# Patient Record
Sex: FEMALE | Race: WHITE | Employment: FULL TIME | URBAN - METROPOLITAN AREA
[De-identification: names, ages, dates, MRNs, and addresses within clinical notes are randomized per-mention and may not be internally consistent; named-entity substitution may affect disease eponyms.]

---

## 2022-05-21 ENCOUNTER — APPOINTMENT (OUTPATIENT)
Dept: GENERAL RADIOLOGY | Age: 22
DRG: 923 | End: 2022-05-21
Attending: EMERGENCY MEDICINE
Payer: COMMERCIAL

## 2022-05-21 ENCOUNTER — HOSPITAL ENCOUNTER (INPATIENT)
Age: 22
LOS: 1 days | Discharge: HOME | DRG: 923 | End: 2022-05-22
Attending: EMERGENCY MEDICINE | Admitting: HOSPITALIST
Payer: COMMERCIAL

## 2022-05-21 ENCOUNTER — APPOINTMENT (OUTPATIENT)
Dept: CT IMAGING | Age: 22
DRG: 923 | End: 2022-05-21
Attending: EMERGENCY MEDICINE
Payer: COMMERCIAL

## 2022-05-21 DIAGNOSIS — T67.01XA HEAT STROKE, INITIAL ENCOUNTER: ICD-10-CM

## 2022-05-21 DIAGNOSIS — R56.9 SEIZURE (HCC): Primary | ICD-10-CM

## 2022-05-21 DIAGNOSIS — N17.9 AKI (ACUTE KIDNEY INJURY) (HCC): ICD-10-CM

## 2022-05-21 DIAGNOSIS — E87.20 LACTIC ACIDOSIS: ICD-10-CM

## 2022-05-21 LAB
ALBUMIN SERPL-MCNC: 4.6 G/DL (ref 3.5–5)
ALBUMIN/GLOB SERPL: 1.2 {RATIO} (ref 1.1–2.2)
ALP SERPL-CCNC: 144 U/L (ref 45–117)
ALT SERPL-CCNC: 39 U/L (ref 12–78)
AMPHET UR QL SCN: NEGATIVE
ANION GAP SERPL CALC-SCNC: 23 MMOL/L (ref 5–15)
APPEARANCE UR: ABNORMAL
AST SERPL-CCNC: 23 U/L (ref 15–37)
BACTERIA URNS QL MICRO: ABNORMAL /HPF
BARBITURATES UR QL SCN: NEGATIVE
BENZODIAZ UR QL: NEGATIVE
BILIRUB SERPL-MCNC: 0.2 MG/DL (ref 0.2–1)
BILIRUB UR QL: NEGATIVE
BUN SERPL-MCNC: 15 MG/DL (ref 6–20)
BUN/CREAT SERPL: 9 (ref 12–20)
CALCIUM SERPL-MCNC: 9.1 MG/DL (ref 8.5–10.1)
CANNABINOIDS UR QL SCN: POSITIVE
CHLORIDE SERPL-SCNC: 102 MMOL/L (ref 97–108)
CK SERPL-CCNC: 226 U/L (ref 26–192)
CO2 SERPL-SCNC: 16 MMOL/L (ref 21–32)
COCAINE UR QL SCN: NEGATIVE
COLOR UR: ABNORMAL
CREAT SERPL-MCNC: 1.6 MG/DL (ref 0.55–1.02)
DRUG SCRN COMMENT,DRGCM: ABNORMAL
EPITH CASTS URNS QL MICRO: ABNORMAL /LPF
GLOBULIN SER CALC-MCNC: 3.8 G/DL (ref 2–4)
GLUCOSE SERPL-MCNC: 73 MG/DL (ref 65–100)
GLUCOSE UR STRIP.AUTO-MCNC: NEGATIVE MG/DL
HCG UR QL: NEGATIVE
HGB UR QL STRIP: ABNORMAL
KETONES UR QL STRIP.AUTO: NEGATIVE MG/DL
LACTATE SERPL-SCNC: 5 MMOL/L (ref 0.4–2)
LEUKOCYTE ESTERASE UR QL STRIP.AUTO: NEGATIVE
METHADONE UR QL: NEGATIVE
MUCOUS THREADS URNS QL MICRO: ABNORMAL /LPF
NITRITE UR QL STRIP.AUTO: NEGATIVE
OPIATES UR QL: NEGATIVE
PCP UR QL: NEGATIVE
PH UR STRIP: 5.5 [PH] (ref 5–8)
POTASSIUM SERPL-SCNC: 3.3 MMOL/L (ref 3.5–5.1)
PROT SERPL-MCNC: 8.4 G/DL (ref 6.4–8.2)
PROT UR STRIP-MCNC: 30 MG/DL
RBC #/AREA URNS HPF: ABNORMAL /HPF (ref 0–5)
SODIUM SERPL-SCNC: 141 MMOL/L (ref 136–145)
SP GR UR REFRACTOMETRY: 1.02 (ref 1–1.03)
UR CULT HOLD, URHOLD: NORMAL
UROBILINOGEN UR QL STRIP.AUTO: 0.2 EU/DL (ref 0.2–1)
WBC URNS QL MICRO: ABNORMAL /HPF (ref 0–4)

## 2022-05-21 PROCEDURE — 65270000029 HC RM PRIVATE

## 2022-05-21 PROCEDURE — 83605 ASSAY OF LACTIC ACID: CPT

## 2022-05-21 PROCEDURE — 80053 COMPREHEN METABOLIC PANEL: CPT

## 2022-05-21 PROCEDURE — 82550 ASSAY OF CK (CPK): CPT

## 2022-05-21 PROCEDURE — 85025 COMPLETE CBC W/AUTO DIFF WBC: CPT

## 2022-05-21 PROCEDURE — 74011250636 HC RX REV CODE- 250/636: Performed by: HOSPITALIST

## 2022-05-21 PROCEDURE — 74011250637 HC RX REV CODE- 250/637: Performed by: EMERGENCY MEDICINE

## 2022-05-21 PROCEDURE — 36415 COLL VENOUS BLD VENIPUNCTURE: CPT

## 2022-05-21 PROCEDURE — 74011250636 HC RX REV CODE- 250/636: Performed by: EMERGENCY MEDICINE

## 2022-05-21 PROCEDURE — 80307 DRUG TEST PRSMV CHEM ANLYZR: CPT

## 2022-05-21 PROCEDURE — 81025 URINE PREGNANCY TEST: CPT

## 2022-05-21 PROCEDURE — 99285 EMERGENCY DEPT VISIT HI MDM: CPT

## 2022-05-21 PROCEDURE — 96374 THER/PROPH/DIAG INJ IV PUSH: CPT

## 2022-05-21 PROCEDURE — 71045 X-RAY EXAM CHEST 1 VIEW: CPT

## 2022-05-21 PROCEDURE — 81001 URINALYSIS AUTO W/SCOPE: CPT

## 2022-05-21 PROCEDURE — 70450 CT HEAD/BRAIN W/O DYE: CPT

## 2022-05-21 RX ORDER — SODIUM CHLORIDE 0.9 % (FLUSH) 0.9 %
5-40 SYRINGE (ML) INJECTION EVERY 8 HOURS
Status: DISCONTINUED | OUTPATIENT
Start: 2022-05-21 | End: 2022-05-22 | Stop reason: HOSPADM

## 2022-05-21 RX ORDER — ACETAMINOPHEN 500 MG
TABLET ORAL
Status: DISPENSED
Start: 2022-05-21 | End: 2022-05-22

## 2022-05-21 RX ORDER — VENLAFAXINE HYDROCHLORIDE 75 MG/1
75 CAPSULE, EXTENDED RELEASE ORAL DAILY
COMMUNITY

## 2022-05-21 RX ORDER — ONDANSETRON 2 MG/ML
4 INJECTION INTRAMUSCULAR; INTRAVENOUS
Status: COMPLETED | OUTPATIENT
Start: 2022-05-21 | End: 2022-05-21

## 2022-05-21 RX ORDER — SODIUM CHLORIDE 0.9 % (FLUSH) 0.9 %
5-40 SYRINGE (ML) INJECTION AS NEEDED
Status: DISCONTINUED | OUTPATIENT
Start: 2022-05-21 | End: 2022-05-22 | Stop reason: HOSPADM

## 2022-05-21 RX ORDER — ACETAMINOPHEN 500 MG
1000 TABLET ORAL ONCE
Status: COMPLETED | OUTPATIENT
Start: 2022-05-21 | End: 2022-05-21

## 2022-05-21 RX ORDER — SODIUM CHLORIDE 9 MG/ML
125 INJECTION, SOLUTION INTRAVENOUS CONTINUOUS
Status: DISCONTINUED | OUTPATIENT
Start: 2022-05-21 | End: 2022-05-22

## 2022-05-21 RX ADMIN — SODIUM CHLORIDE 125 ML/HR: 9 INJECTION, SOLUTION INTRAVENOUS at 20:42

## 2022-05-21 RX ADMIN — SODIUM CHLORIDE 1000 ML: 9 INJECTION, SOLUTION INTRAVENOUS at 18:32

## 2022-05-21 RX ADMIN — ONDANSETRON HYDROCHLORIDE 4 MG: 2 SOLUTION INTRAMUSCULAR; INTRAVENOUS at 16:24

## 2022-05-21 RX ADMIN — ACETAMINOPHEN 1000 MG: 500 TABLET ORAL at 18:32

## 2022-05-21 RX ADMIN — SODIUM CHLORIDE 1000 ML: 9 INJECTION, SOLUTION INTRAVENOUS at 16:24

## 2022-05-21 NOTE — ED TRIAGE NOTES
Pt had a seizure 25 min pta. Pt has a hx of seizures until she was [age] years old, but has not has a seizure since. Not being treated for seizures at this time.

## 2022-05-21 NOTE — ED NOTES
Patient assisted to the bathroom to provide a urine sample by RN and friend. Patient's friend is staying with patient in the bathroom. RN close-by to monitor patient while in the bathroom.

## 2022-05-21 NOTE — ED PROVIDER NOTES
The history is provided by the patient, the EMS personnel and a friend. No  was used. Seizure   This is a new problem. The current episode started less than 1 hour ago. The problem has been resolved. There was 1 seizure. Associated symptoms include cough and vomiting. Pertinent negatives include no confusion, no headaches, no visual disturbance, no sore throat, no chest pain, no nausea and no diarrhea. Characteristics include bit tongue. The episode was witnessed. There was no sensation of an aura present. There was return to baseline postseizure. The seizures did not continue in the ED. She reports vomiting, cough. She reports no chest pain, no confusion, no visual disturbance, no diarrhea, no headaches, no sore throat. History reviewed. No pertinent past medical history. No past surgical history on file. History reviewed. No pertinent family history. Social History     Socioeconomic History    Marital status: Not on file     Spouse name: Not on file    Number of children: Not on file    Years of education: Not on file    Highest education level: Not on file   Occupational History    Not on file   Tobacco Use    Smoking status: Not on file    Smokeless tobacco: Not on file   Substance and Sexual Activity    Alcohol use: Not on file    Drug use: Not on file    Sexual activity: Not on file   Other Topics Concern    Not on file   Social History Narrative    Not on file     Social Determinants of Health     Financial Resource Strain:     Difficulty of Paying Living Expenses: Not on file   Food Insecurity:     Worried About Running Out of Food in the Last Year: Not on file    Alen of Food in the Last Year: Not on file   Transportation Needs:     Lack of Transportation (Medical): Not on file    Lack of Transportation (Non-Medical):  Not on file   Physical Activity:     Days of Exercise per Week: Not on file    Minutes of Exercise per Session: Not on file Stress:     Feeling of Stress : Not on file   Social Connections:     Frequency of Communication with Friends and Family: Not on file    Frequency of Social Gatherings with Friends and Family: Not on file    Attends Anabaptist Services: Not on file    Active Member of Clubs or Organizations: Not on file    Attends Club or Organization Meetings: Not on file    Marital Status: Not on file   Intimate Partner Violence:     Fear of Current or Ex-Partner: Not on file    Emotionally Abused: Not on file    Physically Abused: Not on file    Sexually Abused: Not on file   Housing Stability:     Unable to Pay for Housing in the Last Year: Not on file    Number of [place] in the Last Year: Not on file    Unstable Housing in the Last Year: Not on file         ALLERGIES: Patient has no known allergies. Review of Systems   Constitutional: Negative for activity change, chills and fever. HENT: Negative for nosebleeds, sore throat, trouble swallowing and voice change. Eyes: Negative for visual disturbance. Respiratory: Positive for cough. Negative for shortness of breath. Cardiovascular: Negative for chest pain and palpitations. Gastrointestinal: Positive for vomiting. Negative for abdominal pain, constipation, diarrhea and nausea. Genitourinary: Negative for difficulty urinating, dysuria, hematuria and urgency. Musculoskeletal: Negative for back pain, neck pain and neck stiffness. Skin: Negative for color change. Allergic/Immunologic: Negative for immunocompromised state. Neurological: Negative for dizziness, seizures, syncope, weakness, light-headedness, numbness and headaches. Psychiatric/Behavioral: Negative for behavioral problems, confusion, hallucinations, self-injury and suicidal ideas.        Vitals:    05/21/22 1616 05/21/22 1620   BP:  124/79   Pulse: (!) 157 (!) 156   Resp: 18 18   Temp:  98.8 °F (37.1 °C)   SpO2: 99% 98%   Weight: 66.6 kg (146 lb 13.2 oz)             Physical Exam  Vitals and nursing note reviewed. Constitutional:       General: She is not in acute distress. Appearance: She is well-developed. She is not diaphoretic. HENT:      Head: Normocephalic and atraumatic. Mouth/Throat:     Eyes:      Pupils: Pupils are equal, round, and reactive to light. Cardiovascular:      Rate and Rhythm: Regular rhythm. Tachycardia present. Heart sounds: Normal heart sounds. No murmur heard. No friction rub. No gallop. Pulmonary:      Effort: Pulmonary effort is normal. No respiratory distress. Breath sounds: Normal breath sounds. No wheezing. Abdominal:      General: Bowel sounds are normal. There is no distension. Palpations: Abdomen is soft. Tenderness: There is no abdominal tenderness. There is no guarding or rebound. Musculoskeletal:         General: Normal range of motion. Cervical back: Normal range of motion and neck supple. Skin:     General: Skin is warm. Findings: No rash. Neurological:      Mental Status: She is alert and oriented to person, place, and time. Psychiatric:         Behavior: Behavior normal.         Thought Content: Thought content normal.         Judgment: Judgment normal.          MDM  Number of Diagnoses or Management Options  JOHANNY (acute kidney injury) ([facility] [address]  Heat stroke, initial encounter  Lactic acidosis  Seizure ([facility] [address]  Diagnosis management comments: Total critical care time spent exclusive of procedures:  36min       This is a 41-year-old female with past medical history, review of systems, physical exam as above, presenting via EMS for seizure. Per EMS, patient with witnessed seizure by bystanders, unclear duration, a friend who was shopping with her was not beside her when this episode occurred. The friend who was shopping states when she arrived the patient was unconscious, and covered with ice water. Upon arrival patient awake and alert, tachycardic, coughing, and vomiting.   She states history of febrile seizure is a 1year-old, denies medical problems. She states she smokes marijuana daily a, occasional alcohol use. She denies illness prior to today's event, however states she has had poor p.o. intake, and extremely hot day. Physical exam remarkable for well-appearing young female, in no acute distress noted to be normotensive, afebrile, tachycardic, satting well on room air, despite coughing and vomiting. EMS states they were concerned for aspiration based on coughing on scene. She is noted to have hemostatic laceration to the left tongue. Suspect seizure, unclear whether this may be related to heat. Plan to obtain head CT, CMP, CBC, UA, UDS, urine pregnancy test, CK, lactic acid. Will provide IV fluids, antiemetics, obtain chest x-ray. We will reassess, and make a disposition. Procedures    Perfect Serve Consult for Admission  6:06 PM    ED Room Number: SER03/03  Patient Name and age:  Raghavendra Javier 19 y.o.  female  Working Diagnosis:   1. Seizure ([facility] [address]    2. Heat stroke, initial encounter    3. JOHANNY (acute kidney injury) ([facility] [address]    4.  Lactic acidosis        COVID-19 Suspicion:  no  Sepsis present:  no  Reassessment needed: yes  Code Status:  Full Code  Readmission: no  Isolation Requirements:  no  Recommended Level of Care:  telemetry  Department:[facility] - [phone]  Other:  D/w  [facility]

## 2022-05-22 ENCOUNTER — APPOINTMENT (OUTPATIENT)
Dept: MRI IMAGING | Age: 22
DRG: 923 | End: 2022-05-22
Attending: INTERNAL MEDICINE
Payer: COMMERCIAL

## 2022-05-22 ENCOUNTER — APPOINTMENT (OUTPATIENT)
Dept: CT IMAGING | Age: 22
DRG: 923 | End: 2022-05-22
Attending: INTERNAL MEDICINE
Payer: COMMERCIAL

## 2022-05-22 VITALS
BODY MASS INDEX: 26.02 KG/M2 | HEIGHT: 63 IN | SYSTOLIC BLOOD PRESSURE: 114 MMHG | DIASTOLIC BLOOD PRESSURE: 64 MMHG | RESPIRATION RATE: 14 BRPM | WEIGHT: 146.83 LBS | HEART RATE: 84 BPM | TEMPERATURE: 98 F | OXYGEN SATURATION: 100 %

## 2022-05-22 LAB
ALBUMIN SERPL-MCNC: 3.2 G/DL (ref 3.5–5)
ALBUMIN/GLOB SERPL: 1 {RATIO} (ref 1.1–2.2)
ALP SERPL-CCNC: 101 U/L (ref 45–117)
ALT SERPL-CCNC: 34 U/L (ref 12–78)
ANION GAP SERPL CALC-SCNC: 5 MMOL/L (ref 5–15)
AST SERPL-CCNC: 48 U/L (ref 15–37)
BASOPHILS # BLD: 0 K/UL (ref 0–0.1)
BASOPHILS # BLD: 0.1 K/UL (ref 0–0.1)
BASOPHILS NFR BLD: 0 % (ref 0–1)
BASOPHILS NFR BLD: 1 % (ref 0–1)
BILIRUB SERPL-MCNC: 0.5 MG/DL (ref 0.2–1)
BUN SERPL-MCNC: 8 MG/DL (ref 6–20)
BUN/CREAT SERPL: 9 (ref 12–20)
CALCIUM SERPL-MCNC: 8.2 MG/DL (ref 8.5–10.1)
CHLORIDE SERPL-SCNC: 111 MMOL/L (ref 97–108)
CO2 SERPL-SCNC: 25 MMOL/L (ref 21–32)
CREAT SERPL-MCNC: 0.94 MG/DL (ref 0.55–1.02)
CRP SERPL-MCNC: 0.77 MG/DL (ref 0–0.6)
DIFFERENTIAL METHOD BLD: ABNORMAL
DIFFERENTIAL METHOD BLD: NORMAL
EOSINOPHIL # BLD: 0.3 K/UL (ref 0–0.4)
EOSINOPHIL # BLD: 0.4 K/UL (ref 0–0.4)
EOSINOPHIL NFR BLD: 2 % (ref 0–7)
EOSINOPHIL NFR BLD: 3 % (ref 0–7)
ERYTHROCYTE [DISTWIDTH] IN BLOOD BY AUTOMATED COUNT: 13.2 % (ref 11.5–14.5)
ERYTHROCYTE [DISTWIDTH] IN BLOOD BY AUTOMATED COUNT: 13.4 % (ref 11.5–14.5)
GLOBULIN SER CALC-MCNC: 3.1 G/DL (ref 2–4)
GLUCOSE SERPL-MCNC: 78 MG/DL (ref 65–100)
HCT VFR BLD AUTO: 38.5 % (ref 35–47)
HCT VFR BLD AUTO: 45.7 % (ref 35–47)
HGB BLD-MCNC: 12.3 G/DL (ref 11.5–16)
HGB BLD-MCNC: 14.5 G/DL (ref 11.5–16)
IMM GRANULOCYTES # BLD AUTO: 0 K/UL
IMM GRANULOCYTES # BLD AUTO: 0 K/UL (ref 0–0.04)
IMM GRANULOCYTES NFR BLD AUTO: 0 %
IMM GRANULOCYTES NFR BLD AUTO: 0 % (ref 0–0.5)
LACTATE SERPL-SCNC: 0.8 MMOL/L (ref 0.4–2)
LIPASE SERPL-CCNC: 82 U/L (ref 73–393)
LYMPHOCYTES # BLD: 3.3 K/UL (ref 0.8–3.5)
LYMPHOCYTES # BLD: 8.1 K/UL (ref 0.8–3.5)
LYMPHOCYTES NFR BLD: 30 % (ref 12–49)
LYMPHOCYTES NFR BLD: 40 % (ref 12–49)
MAGNESIUM SERPL-MCNC: 2.4 MG/DL (ref 1.6–2.4)
MCH RBC QN AUTO: 29.1 PG (ref 26–34)
MCH RBC QN AUTO: 29.5 PG (ref 26–34)
MCHC RBC AUTO-ENTMCNC: 31.7 G/DL (ref 30–36.5)
MCHC RBC AUTO-ENTMCNC: 31.9 G/DL (ref 30–36.5)
MCV RBC AUTO: 91.6 FL (ref 80–99)
MCV RBC AUTO: 92.3 FL (ref 80–99)
MONOCYTES # BLD: 0.8 K/UL (ref 0–1)
MONOCYTES # BLD: 1.6 K/UL (ref 0–1)
MONOCYTES NFR BLD: 7 % (ref 5–13)
MONOCYTES NFR BLD: 8 % (ref 5–13)
NEUTS SEG # BLD: 10.2 K/UL (ref 1.8–8)
NEUTS SEG # BLD: 6.4 K/UL (ref 1.8–8)
NEUTS SEG NFR BLD: 50 % (ref 32–75)
NEUTS SEG NFR BLD: 59 % (ref 32–75)
NRBC # BLD: 0 K/UL (ref 0–0.01)
NRBC # BLD: 0 K/UL (ref 0–0.01)
NRBC BLD-RTO: 0 PER 100 WBC
NRBC BLD-RTO: 0 PER 100 WBC
PATH REV BLD -IMP: ABNORMAL
PHOSPHATE SERPL-MCNC: 2.8 MG/DL (ref 2.6–4.7)
PLATELET # BLD AUTO: 292 K/UL (ref 150–400)
PLATELET # BLD AUTO: 476 K/UL (ref 150–400)
PMV BLD AUTO: 9.8 FL (ref 8.9–12.9)
PMV BLD AUTO: 9.8 FL (ref 8.9–12.9)
POTASSIUM SERPL-SCNC: 3.6 MMOL/L (ref 3.5–5.1)
PROT SERPL-MCNC: 6.3 G/DL (ref 6.4–8.2)
RBC # BLD AUTO: 4.17 M/UL (ref 3.8–5.2)
RBC # BLD AUTO: 4.99 M/UL (ref 3.8–5.2)
RBC MORPH BLD: ABNORMAL
SODIUM SERPL-SCNC: 141 MMOL/L (ref 136–145)
TSH SERPL DL<=0.05 MIU/L-ACNC: 1.07 UIU/ML (ref 0.36–3.74)
WBC # BLD AUTO: 10.8 K/UL (ref 3.6–11)
WBC # BLD AUTO: 20.3 K/UL (ref 3.6–11)
WBC MORPH BLD: ABNORMAL

## 2022-05-22 PROCEDURE — 83605 ASSAY OF LACTIC ACID: CPT

## 2022-05-22 PROCEDURE — 70551 MRI BRAIN STEM W/O DYE: CPT

## 2022-05-22 PROCEDURE — 99221 1ST HOSP IP/OBS SF/LOW 40: CPT | Performed by: PSYCHIATRY & NEUROLOGY

## 2022-05-22 PROCEDURE — 74011250637 HC RX REV CODE- 250/637: Performed by: INTERNAL MEDICINE

## 2022-05-22 PROCEDURE — 83735 ASSAY OF MAGNESIUM: CPT

## 2022-05-22 PROCEDURE — 74011000250 HC RX REV CODE- 250: Performed by: HOSPITALIST

## 2022-05-22 PROCEDURE — 80053 COMPREHEN METABOLIC PANEL: CPT

## 2022-05-22 PROCEDURE — 36415 COLL VENOUS BLD VENIPUNCTURE: CPT

## 2022-05-22 PROCEDURE — 74011250636 HC RX REV CODE- 250/636: Performed by: INTERNAL MEDICINE

## 2022-05-22 PROCEDURE — 74011000250 HC RX REV CODE- 250: Performed by: INTERNAL MEDICINE

## 2022-05-22 PROCEDURE — 86140 C-REACTIVE PROTEIN: CPT

## 2022-05-22 PROCEDURE — 83690 ASSAY OF LIPASE: CPT

## 2022-05-22 PROCEDURE — 84443 ASSAY THYROID STIM HORMONE: CPT

## 2022-05-22 PROCEDURE — 85025 COMPLETE CBC W/AUTO DIFF WBC: CPT

## 2022-05-22 PROCEDURE — 84100 ASSAY OF PHOSPHORUS: CPT

## 2022-05-22 RX ORDER — SODIUM CHLORIDE 0.9 % (FLUSH) 0.9 %
5-40 SYRINGE (ML) INJECTION AS NEEDED
Status: DISCONTINUED | OUTPATIENT
Start: 2022-05-22 | End: 2022-05-22 | Stop reason: HOSPADM

## 2022-05-22 RX ORDER — ENOXAPARIN SODIUM 100 MG/ML
40 INJECTION SUBCUTANEOUS DAILY
Status: DISCONTINUED | OUTPATIENT
Start: 2022-05-22 | End: 2022-05-22 | Stop reason: HOSPADM

## 2022-05-22 RX ORDER — POTASSIUM CHLORIDE 750 MG/1
40 TABLET, FILM COATED, EXTENDED RELEASE ORAL
Status: COMPLETED | OUTPATIENT
Start: 2022-05-22 | End: 2022-05-22

## 2022-05-22 RX ORDER — VANCOMYCIN/0.9 % SOD CHLORIDE 1.5G/250ML
1500 PLASTIC BAG, INJECTION (ML) INTRAVENOUS ONCE
Status: DISCONTINUED | OUTPATIENT
Start: 2022-05-22 | End: 2022-05-22

## 2022-05-22 RX ORDER — ONDANSETRON 4 MG/1
4 TABLET, ORALLY DISINTEGRATING ORAL
Status: DISCONTINUED | OUTPATIENT
Start: 2022-05-22 | End: 2022-05-22 | Stop reason: HOSPADM

## 2022-05-22 RX ORDER — ACETAMINOPHEN 650 MG/1
650 SUPPOSITORY RECTAL
Status: DISCONTINUED | OUTPATIENT
Start: 2022-05-22 | End: 2022-05-22 | Stop reason: HOSPADM

## 2022-05-22 RX ORDER — ACETAMINOPHEN 325 MG/1
650 TABLET ORAL
Status: DISCONTINUED | OUTPATIENT
Start: 2022-05-22 | End: 2022-05-22 | Stop reason: HOSPADM

## 2022-05-22 RX ORDER — VENLAFAXINE HYDROCHLORIDE 37.5 MG/1
75 CAPSULE, EXTENDED RELEASE ORAL DAILY
Status: DISCONTINUED | OUTPATIENT
Start: 2022-05-22 | End: 2022-05-22 | Stop reason: HOSPADM

## 2022-05-22 RX ORDER — SODIUM CHLORIDE, SODIUM LACTATE, POTASSIUM CHLORIDE, CALCIUM CHLORIDE 600; 310; 30; 20 MG/100ML; MG/100ML; MG/100ML; MG/100ML
150 INJECTION, SOLUTION INTRAVENOUS CONTINUOUS
Status: DISCONTINUED | OUTPATIENT
Start: 2022-05-22 | End: 2022-05-22 | Stop reason: HOSPADM

## 2022-05-22 RX ORDER — POLYETHYLENE GLYCOL 3350 17 G/17G
17 POWDER, FOR SOLUTION ORAL DAILY PRN
Status: DISCONTINUED | OUTPATIENT
Start: 2022-05-22 | End: 2022-05-22 | Stop reason: HOSPADM

## 2022-05-22 RX ORDER — ONDANSETRON 2 MG/ML
4 INJECTION INTRAMUSCULAR; INTRAVENOUS
Status: DISCONTINUED | OUTPATIENT
Start: 2022-05-22 | End: 2022-05-22 | Stop reason: HOSPADM

## 2022-05-22 RX ORDER — SODIUM CHLORIDE 0.9 % (FLUSH) 0.9 %
5-40 SYRINGE (ML) INJECTION EVERY 8 HOURS
Status: DISCONTINUED | OUTPATIENT
Start: 2022-05-22 | End: 2022-05-22 | Stop reason: HOSPADM

## 2022-05-22 RX ADMIN — SODIUM CHLORIDE, PRESERVATIVE FREE 10 ML: 5 INJECTION INTRAVENOUS at 06:00

## 2022-05-22 RX ADMIN — VENLAFAXINE HYDROCHLORIDE 75 MG: 37.5 CAPSULE, EXTENDED RELEASE ORAL at 09:16

## 2022-05-22 RX ADMIN — POTASSIUM CHLORIDE 40 MEQ: 750 TABLET, EXTENDED RELEASE ORAL at 08:42

## 2022-05-22 RX ADMIN — SODIUM CHLORIDE, POTASSIUM CHLORIDE, SODIUM LACTATE AND CALCIUM CHLORIDE 150 ML/HR: 600; 310; 30; 20 INJECTION, SOLUTION INTRAVENOUS at 07:00

## 2022-05-22 RX ADMIN — Medication 1 CAPSULE: at 09:19

## 2022-05-22 RX ADMIN — SODIUM CHLORIDE, PRESERVATIVE FREE 10 ML: 5 INJECTION INTRAVENOUS at 07:00

## 2022-05-22 NOTE — ED NOTES
Pt up at sink brushing teeth and washing hair. Family in room to assist.  Patient helped back to bed and placed on cardiac monitor as ordered. VSS.

## 2022-05-22 NOTE — ROUTINE PROCESS
Bedside RN performed patient education and medication education. Discharge concerns initiated and discussed with patient, including clarification on \"who\" assists the patient at their home and instructions for when the home going patient should call their provider after discharge. Opportunity for questions and clarification was provided. Patient receptive to education: YES  Patient stated: \"Am I more likely to have heat exhaustion again? \"  Barriers to Education: none  Diagnosis Education given:  YES    Length of stay: 1  Expected Day of Discharge: 1  Ask if they have \"Help at Home\" & add to white board?   YES    Education Day #: 1    Medication Education Given:  YES  M in the box Medication name: effexor    Pt aware of [facility]CA[facility] survey: YES

## 2022-05-22 NOTE — CONSULTS
INPATIENT NEUROLOGY CONSULTATION  5/22/2022     Consulted by: Мария Guillen MD        Patient ID:  Brett Locke  [id number]  38 y.o.  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 I spoke with her personally and her father at the bedside. It sounds like typically on a usual day she drinks about a gallon of water for health purposes in the last 2-3 days she has not been drinking the same amount of water as usual and she has been out and about in the current heat wave. Yesterday she was at an outdoor shopping mall and had maybe a cup of water to drink all day and then after lunch she was shopping and then suddenly felt very lightheaded. She went to the water fountain. She began to have tunnel vision. Shaking of the arms. And then she was unconscious. EMS was activated. She had tongue biting. She has fragmented memory of traveling to the emergency room. Currently she feels at her baseline. Her father agrees. She has a remote history of febrile seizures at age 1 that resolved spontaneously. She only takes Effexor. Father reports normal growth and development. Head CT here negative. She was also admitted for concerns of sepsis because of lab abnormalities. Review of Systems   Neurological: Positive for loss of consciousness. All other systems reviewed and are negative. History reviewed. No pertinent past medical history. History reviewed. No pertinent family history.   Social History     Socioeconomic History    Marital status: SINGLE     Spouse name: Not on file    Number of children: Not on file    Years of education: Not on file    Highest education level: Not on file   Occupational History    Not on file   Tobacco Use    Smoking status: Not on file    Smokeless tobacco: Not on file   Substance and Sexual Activity    Alcohol use: Not on file    Drug use: Not on file    Sexual activity: Not on file   Other Topics Concern    Not on file   Social History Narrative    Not on file     Social Determinants of Health     Financial Resource Strain:     Difficulty of Paying Living Expenses: Not on file   Food Insecurity:     Worried About Running Out of Food in the Last Year: Not on file    Alen of Food in the Last Year: Not on file   Transportation Needs:     Lack of Transportation (Medical): Not on file    Lack of Transportation (Non-Medical):  Not on file   Physical Activity:     Days of Exercise per Week: Not on file    Minutes of Exercise per Session: Not on file   Stress:     Feeling of Stress : Not on file   Social Connections:     Frequency of Communication with Friends and Family: Not on file    Frequency of Social Gatherings with Friends and Family: Not on file    Attends Spiritism Services: Not on file    Active Member of Clubs or Organizations: Not on file    Attends Club or Organization Meetings: Not on file    Marital Status: Not on file   Intimate Partner Violence:     Fear of Current or Ex-Partner: Not on file    Emotionally Abused: Not on file    Physically Abused: Not on file    Sexually Abused: Not on file   Housing Stability:     Unable to Pay for Housing in the Last Year: Not on file    Number of Places Lived in the Last Year: Not on file    Unstable Housing in the Last Year: Not on file     Current Facility-Administered Medications   Medication Dose Route Frequency    venlafaxine-SR (EFFEXOR-XR) capsule 75 mg  75 mg Oral DAILY    sodium chloride (NS) flush 5-40 mL  5-40 mL IntraVENous Q8H    sodium chloride (NS) flush 5-40 mL  5-40 mL IntraVENous PRN    acetaminophen (TYLENOL) tablet 650 mg  650 mg Oral Q6H PRN    Or    acetaminophen (TYLENOL) suppository 650 mg  650 mg Rectal Q6H PRN    polyethylene glycol (MIRALAX) packet 17 g  17 g Oral DAILY PRN    ondansetron (ZOFRAN ODT) tablet 4 mg  4 mg Oral Q8H PRN    Or    ondansetron (ZOFRAN) injection 4 mg  4 mg IntraVENous Q6H PRN    enoxaparin (LOVENOX) injection 40 mg  40 mg SubCUTAneous DAILY    lactated Ringers infusion  150 mL/hr IntraVENous CONTINUOUS    L.acidophilus-paracasei-S.thermophil-bifidobacter (RISAQUAD) 8 billion cell capsule  1 Capsule Oral DAILY    vancomycin (VANCOCIN) 1500 mg in  ml infusion  1,500 mg IntraVENous ONCE    piperacillin-tazobactam (ZOSYN) 4.5 g in 0.9% sodium chloride (MBP/ADV) 100 mL MBP  4.5 g IntraVENous NOW    Followed by    piperacillin-tazobactam (ZOSYN) 3.375 g in 0.9% sodium chloride (MBP/ADV) 100 mL MBP  3.375 g IntraVENous Q8H    Vancomycin - Pharmacy to Dose   Other Rx Dosing/Monitoring    sodium chloride (NS) flush 5-40 mL  5-40 mL IntraVENous Q8H    sodium chloride (NS) flush 5-40 mL  5-40 mL IntraVENous PRN     No Known Allergies    Visit Vitals  BP (!) 86/55   Pulse 88   Temp 98.9 °F (37.2 °C)   Resp 15   Ht 5' 3\" (1.6 m)   Wt 146 lb 13.2 oz (66.6 kg)   SpO2 97%   BMI 26.01 kg/m²     Physical Exam  Vitals and nursing note reviewed. Constitutional:       Appearance: Normal appearance. She is normal weight. Cardiovascular:      Rate and Rhythm: Normal rate. Pulmonary:      Effort: Pulmonary effort is normal.   Skin:     General: Skin is warm. Neurological:      Mental Status: She is alert and oriented to person, place, and time. Coordination: Finger-Nose-Finger Test normal.      Gait: Gait is intact. Deep Tendon Reflexes: Strength normal.       Neurologic Exam     Mental Status   Oriented to person, place, and time. Cranial Nerves   Cranial nerves II through XII intact. Motor Exam   Muscle bulk: normal    Strength   Strength 5/5 throughout.      Sensory Exam   Light touch normal.     Gait, Coordination, and Reflexes     Gait  Gait: normal    Coordination   Finger to nose coordination: normal           Lab Results   Component Value Date/Time WBC 10.8 05/22/2022 08:49 AM    HGB 12.3 05/22/2022 08:49 AM    HCT 38.5 05/22/2022 08:49 AM    PLATELET 677 42/51/1139 08:49 AM    MCV 92.3 05/22/2022 08:49 AM     Lab Results   Component Value Date/Time    Glucose 73 05/21/2022 04:30 PM    Creatinine 1.60 (H) 05/21/2022 04:30 PM      No results found for: CHOL, CHOLPOCT, HDL, LDL, LDLC, LDLCPOC, LDLCEXT, TRIGL, TGLPOCT, CHHD, CHHDX  Lab Results   Component Value Date/Time    ALT (SGPT) 39 05/21/2022 04:30 PM    Alk. phosphatase 144 (H) 05/21/2022 04:30 PM    Bilirubin, total 0.2 05/21/2022 04:30 PM    Albumin 4.6 05/21/2022 04:30 PM    Protein, total 8.4 (H) 05/21/2022 04:30 PM    PLATELET 259 53/90/9020 08:49 AM        CT Results (maximum last 3): Results from [place] encounter on 05/21/22    CT HEAD WO CONT    Narrative  INDICATION: seizure    EXAM: CT HEAD without contrast.    TECHNIQUE: Unenhanced CT Head is performed. CT dose reduction was achieved  through use of a standardized protocol tailored for this examination and  automatic exposure control for dose modulation. FINDINGS: Brain parenchyma shows no CT apparent ischemia. There is no apparent  mass on unenhanced imaging. There is no bleed, shift, obstructive hydrocephalus  or significant extra-axial fluid collection. Bone windows are unremarkable. Impression  No acute intracranial finding. MRI Results (maximum last 3): No results found for this or any previous visit. VAS/US/Carotid Doppler Results (maximum last 3): No results found for this or any previous visit. PET Results (maximum last 3): No results found for this or any previous visit. Assessment and Plan        58-year-old woman who had a syncopal event that I suspect probably caused convulsive syncope in the setting of transient cerebral hypoperfusion secondary to hypotension/dehydration. I do not think this was an epileptic event. I would not start her on medications or anticonvulsants.   She is back at her baseline. I offered her to stay an additional day and night to get additional neuroimaging and EEG done but I also offered her the option to do everything as an outpatient. She would like to pursue her additional testing as an outpatient which I think is reasonable. She lives in [place] and is already an established patient with the [facility] system so I would asked that she contact her primary care physician the next business day to coordinate referral to neurology. I discussed with her that in a [facility] patient is to have unexplained loss of consciousness cannot drive for 6 months until cleared by a neurologist or cardiologist based on the presenting complaint. If she is not discharged today and gets any additional testing done neurologically I will follow-up. I spoke with her and her father face-to-face at the bedside today. I also spoke with the hospitalist and discussed the plan. During this evaluation, we also discussed stroke education to include signs and symptoms of stroke and TIA. This clinical note was dictated with an electronic dictation software that can make unintentional errors. If there are any questions, please contact me directly for clarification.       [facility],   NEUROLOGIST  Diplomate GLEN  5/22/2022

## 2022-05-22 NOTE — DISCHARGE INSTRUCTIONS
Please bring this form with you to show your primary care provider at your follow-up appointment. Primary care provider:  Dr. [place] Obrien MD    Discharging provider:  Paulino Serrato MD    You have been admitted to the hospital with the following diagnoses:  · Seizure [facility]) [R56.9]    FOLLOW-UP CARE RECOMMENDATIONS:    APPOINTMENTS:  · Follow-up with primary care provider, Dr. lForida Obrien MD  -  Please call [phone] shortly after discharge to set up an appointment to be seen in 1 week    · Neurology as needed     FOLLOW-UP TESTS recommended: EEG     PENDING TEST RESULTS:  At the time of your discharge the following test results are still pending: none   Please make sure you review these results with your outpatient follow-up provider(s). SYMPTOMS to watch for: chest pain, shortness of breath, fever, chills, nausea, vomiting, diarrhea, change in mentation, falling, weakness, bleeding. DIET/what to eat:  Regular Diet    ACTIVITY:  Activity as tolerated    What to do if new or unexpected symptoms occur? If you experience any of the above symptoms (or should other concerns or questions arise after discharge) please call your primary care physician. Return to the emergency room if you cannot get hold of your doctor. · It is very important that you keep your follow-up appointment(s). · Please bring discharge papers, medication list (and/or medication bottles) to your follow-up appointments for review by your outpatient provider(s). · Please check the list of medications and be sure it includes every medication (even non-prescription medications) that your provider wants you to take. · It is important that you take the medication exactly as they are prescribed. · Keep your medication in the bottles provided by the pharmacist and keep a list of the medication names, dosages, and times to be taken in your wallet. · Do not take other medications without consulting your doctor.    · If you have any questions about your medications or other instructions, please talk to your nurse or care provider before you leave the hospital.    I understand that if any problems occur once I am at home I am to contact my physician. These instructions were explained to me and I had the opportunity to ask questions.

## 2022-05-22 NOTE — ED NOTES
[facility] here to transport. Pt stable and ready for transport. EMTALA and all paperwork appropriately filled out.

## 2022-05-22 NOTE — ROUTINE PROCESS
TRANSFER - OUT REPORT:    Verbal [facility]  being transferred to [facility] for routine progression of care       Report consisted of patients Situation, Background, Assessment and   Recommendations(SBAR). Information from the following report(s) ED Summary was reviewed with the receiving nurse. Lines:   Peripheral IV 05/21/22 Anterior;Proximal;Right Forearm (Active)        Opportunity for questions and clarification was provided.       Patient transported with:   Monitor   IV fluids

## 2022-05-22 NOTE — PROGRESS NOTES
2042- Report called from [facility] [place] at [address] [facility] - [place] REPORT:    Verbal report received from [facility]) on [address]  being received from [facility] ER (unit) for routine progression of care      Report consisted of patients Situation, Background, Assessment and   Recommendations(SBAR). Information from the following report(s) SBAR, Kardex, ED Summary, Procedure Summary, Intake/Output, Accordion, Recent Results, Med Rec Status, Cardiac Rhythm NSR and Dual Neuro Assessment was reviewed with the receiving nurse. Opportunity for questions and clarification was provided. Assessment completed upon patients arrival to unit and care assumed. Primary Nurse Stefanie Reis, NORMA and Michelle Malloy RN performed a dual skin assessment on this patient No impairment noted  Gilbert score is 23    0730- Bedside and Verbal shift change report given to [id number] (oncoming nurse) by Cat RN (offgoing nurse). Report included the following information SBAR, Kardex, ED Summary, Intake/Output, MAR, Recent Results, Cardiac Rhythm NSR, Alarm Parameters  and Dual Neuro Assessment. 0900- Labs sent and K replaced at this time.

## 2022-05-22 NOTE — PROGRESS NOTES
Clinical Pharmacy Note - Extended Infusion Beta-Lactam Dosing    This patient has been ordered Zosyn at 3.375 g IV every 6 hours. P&T protocol allows automatic substitution to an extended infusion and to adjust the dose based on indication and renal function. Indication: Sepsis    CrCl: 50.2 mL/min    Per P&T protocol, the Zosyn dosing will be adjusted to 3.375 g IV every 8 hours (each dose will be infused over 4 hours). Please call pharmacy with any questions.

## 2022-05-22 NOTE — DISCHARGE SUMMARY
Discharge Summary     Patient:  Carter Allison       MRN: [id number]       YOB: 2000       Age: 19 y.o. Date of admission:  5/21/2022    Date of discharge:  5/22/2022    Primary care provider: Dr. Salud Bernard MD    Admitting provider:  Bella Benitez MD    Discharging provider:  Sangita Chatman [address] [phone]. If unavailable, call [phone] and ask the  to page the triage hospitalist.    Consultations  · IP CONSULT TO NEUROLOGY    Procedures  · * No surgery found *    Discharge destination: home. The patient is stable for discharge. Admission diagnosis  · Seizure ([address] [R56.9]    Current Discharge Medication List      CONTINUE these medications which have NOT CHANGED    Details   venlafaxine-SR (Effexor XR) 75 mg capsule Take 75 mg by mouth daily. Follow-up Information     Follow up With Specialties Details Why Contact Info    Salud Bernard MD Family Medicine In 1 week  [place] [address]  [phone]            Final discharge diagnoses and brief hospital course  This is a 24-year-old woman with a past medical history significant for depression, asthma, who was in her usual state of health until the day of presentation at the emergency room when it was reported that the patient developed seizure. The patient stated that she was out shopping with a friend. Her friend was not beside her at that time. When the friend finally got to her, the patient was unconscious and covered with ice water. She was taken to [facility] Emergency Room for further evaluation. By the time the patient arrived at the emergency room, the patient is alert, oriented, but she has tachycardia and was able to provide history.   The duration of the seizure activity was not known, but EMS stated that the patient developed a cough and vomiting following this episode. The patient has stated that she has history of febrile seizure at the age of 1, but as an adult the patient has not been diagnosed with seizure and does not take seizure medication. Evaluation in the emergency room included CT scan of the head, which was negative. Chest x-ray was also negative. The patient stated that she has not kept her body hydrated, was hot a whole lot and the environmental temperature was very high. Seizure like epsiode - suspect Convulsive syncope  -  CT head: No acute intracranial finding  - MRI brain: Negative   -  EEG ordered  - appreciate Neurology input \" convulsive syncope in the setting of transient cerebral hypoperfusion secondary to hypotension/dehydration. \"     JOHANNY- resolved  Lactic acidosis - resolved  Leucocytosis - resolved  Thrombocytosis- resolved   - likely due to dehydration   - no signs of infection. ua clean, CXR - clear lungs  - dced abx      Depression- c/w Effexor    Marijuana abuse -advised to quit. Discharge recommendations:  PCP f/u in 1 week  EEG as outpt   Advised to drink lots of fluids    Discharge plan discussed with patient and her father at bedside. They understood and agreed     Physical examination at discharge  Visit Vitals  /64   Pulse 84   Temp 98 °F (36.7 °C)   Resp 14   Ht 5' 3\" (1.6 m)   Wt 66.6 kg (146 lb 13.2 oz)   SpO2 100%   BMI 26.01 kg/m²     HEENT:  Head:  Normocephalic, atraumatic. Eyes:  Normal eye movement. No redness, no drainage, no discharge. Ears:  Normal external ears with no evidence of drainage. Nose:  No deformity and no drainage. Mouth and Throat:  Laceration to the left tongue noted. NECK:  Neck is supple. No JVD, no thyromegaly. CHEST:  Clear breath sounds. No wheezing, no crackles. HEART:  Normal S1 and S2, regular. No clinically appreciable murmur. ABDOMEN:  Soft, nontender. Normal bowel sounds. CNS:  Alert, oriented x3. No gross focal neurological deficit. EXTREMITIES:  No edema. Pulses 2+ bilaterally. MUSCULOSKELETAL SYSTEM:  No evidence of joint deformity or swelling. SKIN:  No active skin lesions seen in the exposed part of the body. PSYCHIATRY:  Normal mood and affect. Pertinent imaging studies:    Per EMR     Recent Labs     05/22/22  0849 05/21/22  1630   WBC 10.8 20.3*   HGB 12.3 14.5   HCT 38.5 45.7    476*     Recent Labs     05/22/22  0849 05/21/22  1630    141   K 3.6 3.3*   * 102   CO2 25 16*   BUN 8 15   CREA 0.94 1.60*   GLU 78 73   CA 8.2* 9.1   MG 2.4  --    PHOS 2.8  --      Recent Labs     05/22/22  0849 05/21/22  1630    144*   TP 6.3* 8.4*   ALB 3.2* 4.6   GLOB 3.1 3.8   LPSE 82  --      No results for input(s): INR, PTP, APTT, INREXT in the last 72 hours. No results for input(s): FE, TIBC, PSAT, FERR in the last 72 hours. No results for input(s): PH, PCO2, PO2 in the last 72 hours. Recent Labs     05/21/22  1630   *     No components found for: [place]    Chronic Diagnoses:    Problem List as of 5/22/2022 Date Reviewed: 5/22/2022          Codes Class Noted - Resolved    * (Principal) Seizure ([facility] [address] ICD-10-CM: R56.9  ICD-9-CM: 383.26  5/21/2022 - Present              Time spent on discharge related activities today greater than 30 minutes.       Signed:  Nena Us MD                 Hospitalist, Internal Medicine      Cc: Baljit Giraldo MD

## 2022-05-22 NOTE — H&P
[address]  HISTORY AND PHYSICAL    Name:  Stephanie Up  MR#:  [id number]  :  2000  ACCOUNT #:  [de-identified]  ADMIT DATE:  2022    The patient was seen, evaluated, and admitted by me on 2022. PRIMARY CARE PHYSICIAN:  Traci Mark MD    SOURCE OF INFORMATION:  The patient, the patient's father who was present at the bedside, and review of ED electronic medical record. CHIEF COMPLAINT:  Seizures. HISTORY OF PRESENT ILLNESS:  This is a 70-year-old woman with a past medical history significant for depression, asthma, who was in her usual state of health until the day of presentation at the emergency room when it was reported that the patient developed seizure. The patient stated that she was out shopping with a friend. Her friend was not beside her at that time. When the friend finally got to her, the patient was unconscious and covered with ice water. She was taken to [facility] Emergency Room for further evaluation. By the time the patient arrived at the emergency room, the patient is alert, oriented, but she has tachycardia and was able to provide history. The duration of the seizure activity was not known, but EMS stated that the patient developed a cough and vomiting following this episode. The patient has stated that she has history of febrile seizure at the age of 1, but as an adult the patient has not been diagnosed with seizure and does not take seizure medication. Evaluation in the emergency room included CT scan of the head, which was negative. Chest x-ray was also negative. The patient stated that she has not kept her body hydrated, was hot a whole lot and the environmental temperature was very high. The patient has no record of prior admission to the hospital.    PAST MEDICAL HISTORY:  Asthma, depression. ALLERGIES:  NO KNOWN DRUG ALLERGIES. MEDICATIONS:  Effexor XR 75 mg daily. FAMILY HISTORY:  This was reviewed.   Her father has dyslipidemia. Her mother is prediabetic. No family history of seizure disorder. PAST SURGICAL HISTORY:  Not significant. SOCIAL HISTORY:  No history of tobacco abuse. The patient admits to occasional consumption of alcohol, but smoke marijuana on a daily basis. REVIEW OF SYSTEMS:  HEAD, EYES, EARS, NOSE, AND THROAT:  No headache, no dizziness, no blurring of vision, no photophobia. RESPIRATORY SYSTEM:  This is positive for cough. No shortness of breath, no hemoptysis. CARDIOVASCULAR SYSTEM:  No chest pain, no orthopnea, no palpitation. GASTROINTESTINAL SYSTEM:  This is positive for vomiting. No abdominal pain. No diarrhea. No constipation. GENITOURINARY SYSTEM:  No dysuria, no urgency, and no frequency. All other systems are reviewed and they are negative. PHYSICAL EXAMINATION:  GENERAL APPEARANCE:  The patient appeared ill, in moderate distress. VITAL SIGNS:  On arrival at the emergency room, temperature 98.8, pulse 157, respiratory rate 18, blood pressure 124/79, oxygen saturation 99% on room air. HEENT:  Head:  Normocephalic, atraumatic. Eyes:  Normal eye movement. No redness, no drainage, no discharge. Ears:  Normal external ears with no evidence of drainage. Nose:  No deformity and no drainage. Mouth and Throat:  Laceration to the left tongue noted. NECK:  Neck is supple. No JVD, no thyromegaly. CHEST:  Clear breath sounds. No wheezing, no crackles. HEART:  Normal S1 and S2, regular. No clinically appreciable murmur. ABDOMEN:  Soft, nontender. Normal bowel sounds. CNS:  Alert, oriented x3. No gross focal neurological deficit. EXTREMITIES:  No edema. Pulses 2+ bilaterally. MUSCULOSKELETAL SYSTEM:  No evidence of joint deformity or swelling. SKIN:  No active skin lesions seen in the exposed part of the body. PSYCHIATRY:  Normal mood and affect. LYMPHATIC SYSTEM:  No cervical lymphadenopathy.     DIAGNOSTIC DATA:  CT scan of the head without contrast, negative. Chest x-ray, no acute disease. LABORATORY DATA:  Chemistry:  Sodium 141, potassium 3.3, chloride 102, CO2 16, glucose 73, BUN 15, creatinine 1.60, calcium 9.1, total bilirubin 0.2, ALT 39, AST 23, alkaline phosphatase 144, total protein 8.4, albumin level 4.6, globulin at 3.8. Urine pregnancy test, negative. Hematology:  WBC 20.3, hemoglobin at 14.3, hematocrit 45.7, platelets 929. Urinalysis: This is significant for negative nitrite, negative leukocyte esterase, 1+ bacteria, small blood. Urine Drug Screen: This is positive for THC. Lactic acid level 5.0.    ASSESSMENT:  1. Suspected seizure disorder. 2.  Acute kidney injury. 3.  Suspected sepsis. 4.  Thrombocytosis. 5.  Hypokalemia. 6.  Suspected heat exhaustion. 7.  Depression. 8.  Mild intermittent asthma without complication. 9.  Marijuana abuse. PLAN:  1. Suspected seizure disorder. We will admit the patient for further evaluation and treatment. We will obtain MRI of the brain, EEG. Neurology consult to assist in further evaluation and treatment. We will place the patient on seizure precaution. 2.  Acute kidney injury. This is most likely due to volume depletion. We will carry out fluid therapy and monitor the patient's renal function. 3.  Suspected sepsis. The patient presented with markedly elevated white blood cell count, tachycardia, and elevated lactic acid level. The patient was reported to be having coughing and vomiting at the scene, raising the suspicion for aspiration. Although, the chest x-ray is negative, we will start the patient on vancomycin and Zosyn. The patient received fluid therapy as per sepsis protocol in the emergency room, but was not started on antibiotics. We will obtain CT scan of the chest to evaluate the patient for pneumonia. We will also obtain CT scan of the abdomen and pelvis to evaluate the patient for other possible source of infection. 4.  Thrombocytosis.   This is most likely reactive thrombocytosis. We will monitor the patient's platelet count. 5.  Hypokalemia. We will replace potassium and repeat the potassium level. We will also check magnesium level. 6.  Suspected heat exhaustion. We will carry out fluid therapy and monitor the patient closely. 7.  Depression. We will continue with preadmission medication. This is well controlled on Wellbutrin. 8.  Mild intermittent asthma without complication. We will continue to monitor and place the patient on DuoNeb as needed. 9.  Marijuana abuse. The patient advised to quit. We will carry out supportive treatment. 10.  Other Issues:  Code Status: The patient is a full code. We will place the patient on Lovenox for DVT prophylaxis. FUNCTIONAL STATUS PRIOR TO ADMISSION:  The patient came from home. The patient is ambulatory with no assistive device. COVID PRECAUTION:  The patient was wearing a face mask. I was wearing a face mask and gloves for this patient's encounter.       Zayra Leon MD      [id number]  D:  05/22/2022 6:00  T:  05/22/2022 8:00  JOB #:  [id number]  CC:  Priyanka Davis MD

## 2022-06-02 NOTE — PROGRESS NOTES
Physician Progress Note      Reanna Watt  [facility] #:                  [id number]  :                       2000  ADMIT DATE:       2022 4:12 PM  Yaneth Solis DATE:        2022 5:14 PM  RESPONDING  PROVIDER #:        Barron Juan MD          QUERY TEXT:    Pt admitted after syncopal event. Neurology Consultant states \"I do not think this was an epileptic event\". . Pt noted to have been out in the heat and not taking in adequate PO. After further study is it possible to state    The medical record reflects the following:  Risk Factors: 21F, no baseline seizure disorder, out in the heat, statement that she was not drinking enough water  Clinical Indicators:    H&P The patient stated that she was out shopping with a friend. Her friend was not beside her at that time. When the friend finally got to her, the patient was unconscious and covered with ice water,,, The patient stated that she has not kept her body hydrated, was hot a whole lot and the environmental temperature was very high.     Christofer BROOKS Neuro Consult  76-year-old woman who had a syncopal event that I suspect probably caused convulsive syncope in the setting of transient cerebral hypoperfusion secondary to hypotension/dehydration. I do not think this was an epileptic event. I would not start her on medications or anticonvulsants. She is back at her baseline. DCS Seizure like epsiode - suspect Convulsive syncope  - ?CT head: No acute intracranial finding  - MRI brain: Negative  -  EEG ordered  - appreciate? Neurology input \" convulsive syncope in the setting of transient cerebral hypoperfusion secondary to hypotension/dehydration.  \"  Discharge recommendations:  PCP f/u in 1 week  EEG as outpt  Advised to drink lots of fluids    Treatment: IP Admission, Neuro Consult, CT,  IVF, Lab Monitoring, Encourage PO, DC recommendations as above  Options provided:  -- Syncope due to environmental heat exposure and dehydration with seizure ruled out  -- Other - I will add my own diagnosis  -- Disagree - Not applicable / Not valid  -- Disagree - Clinically unable to determine / Unknown  -- Refer to Clinical Documentation Reviewer    PROVIDER RESPONSE TEXT:    This patient was treated for a syncopal event that was due to environmental heat exposure and dehydration with seizure ruled out    Query created by: Mahogany Carrion on 6/1/2022 4:06 PM      Electronically signed by:  Ana Vila MD 6/2/2022 7:16 AM